# Patient Record
Sex: FEMALE | Race: WHITE
[De-identification: names, ages, dates, MRNs, and addresses within clinical notes are randomized per-mention and may not be internally consistent; named-entity substitution may affect disease eponyms.]

---

## 2017-03-04 ENCOUNTER — HOSPITAL ENCOUNTER (EMERGENCY)
Dept: HOSPITAL 80 - CED | Age: 42
Discharge: HOME | End: 2017-03-04
Payer: COMMERCIAL

## 2017-03-04 VITALS
DIASTOLIC BLOOD PRESSURE: 84 MMHG | RESPIRATION RATE: 16 BRPM | SYSTOLIC BLOOD PRESSURE: 109 MMHG | HEART RATE: 78 BPM | OXYGEN SATURATION: 98 % | TEMPERATURE: 98.4 F
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
## 2017-03-04 DIAGNOSIS — S93.411A: Primary | ICD-10-CM
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
## 2017-03-04 PROCEDURE — L4350 ANKLE CONTROL ORTHO PRE OTS: HCPCS

## 2017-03-04 NOTE — UCPHY
H & P


Patient Type: New


HPI/ROS: 





CHIEF COMPLAINT:  Right ankle pain and swelling. 





HISTORY OF PRESENT ILLNESS:  The patient is a 41-year-old female who presents 

after rolling her right ankle a few hours ago while hiking. She experienced 

immediate pain and lateral swelling. The pain is worsened with movement and she 

is having difficulty walking, Though is able to walk as she had to get out of 

woods.. She did not hear a pop or crack. She has a history of tendon injuries 

to her right ankle 5 years ago. 





REVIEW OF SYSTEMS:


Constitutional:  No fever, no chills.


Musculoskeletal:  As above. 


Skin:  No rashes.


Neurological:    No paresthesias





Past Medical/Surgical History: 





Denies. 


Social History: 





Nonsmoker, , mother, moved from [place] 1 year ago. 


Smoking Status: Never smoked


Physical Exam: 








General Appearance:  Alert, no distress.  Afebrile.


Extremities:  There is mild soft tissue swelling present over the lateral 

malleolus.  There is particular tenderness at that site as well over the 

lateral malleolus.  The drawer sign is negative.  No signs laxity.  Compression 

testing negative.


Neurological:  NV intact.


Skin:  Skin is intact.   Warm and dry, no rashes.  no lymphangitis.





.








Constitutional: 


 Initial Vital Signs











Temperature (C)  36.9 C   03/04/17 16:32


 


Heart Rate  78   03/04/17 16:32


 


Respiratory Rate  16   03/04/17 16:32


 


Blood Pressure  109/84 H  03/04/17 16:32


 


O2 Sat (%)  98   03/04/17 16:32








 











O2 Delivery Mode               Room Air














Allergies/Adverse Reactions: 


 





No Known Allergies Allergy (Verified 03/04/17 16:36)


 








Home Medications: 














 Medication  Instructions  Recorded


 


Butalbital-ASA-Caffeine Cap  03/04/17


 


Flonase Nasal Spray  03/04/17


 


Phenergan  03/04/17


 


Zofran  03/04/17














Medical Decision Making





- Diagnostics


Imaging: 





Study: Right ankle X-ray


Indication: Trauma, pain


Results: I viewed the images myself on the PACS system. My interpretation of 

the images is: no fracture.  The radiologist interpretation is pending at the 

time of this dictation. 





ED Course/Re-evaluation: 





Right ankle x-ray ordered. 





  Once x-rays were negative she was fitted for a ankle stirrup brace.


Differential Diagnosis: 





The differential diagnosis includes but is not limited to:


Fracture, Sprain, Strain, Dislocation, Nerve injury








Departure





- Departure


Disposition: Home, Routine, Self-Care


Clinical Impression: 


Right ankle sprain


Qualifiers:


 Encounter type: initial encounter Involved ligament of ankle: calcaneofibular 

ligament Qualified Code(s): S93.411A - Sprain of calcaneofibular ligament of 

right ankle, initial encounter





Condition: Good


Instructions:  Ankle Sprain (ED)


Additional Instructions: 


Take 600mg Ibuprofen every 6-8 hours as needed for pain. 





Ice affected joint for pain. 





Call Dr. Robert, orthopedics, in the next 5-7 days if symptoms are not improving. 





Return for any serious worsening of condition. 


Referrals: 


Miller Robert MD [Medical Doctor] - As per Instructions





- PQRS


PQRS Measurement: 





Not applicable. 


Report Scribed for: Steve Bolaños


Report Scribed by: Franc Albrecht


Date of Report: 03/04/17


Time of Report: 17:32


Physician Review and Approval Statement: 





03/04/17 17:33


Portions of this note were transcribed by a medical scribe.  I personally 

performed a history, physical exam, medical decision making, and confirmed 

accuracy of information the transcribed note.

## 2017-07-12 ENCOUNTER — HOSPITAL ENCOUNTER (OUTPATIENT)
Dept: HOSPITAL 80 - BMCIMAGING | Age: 42
End: 2017-07-12
Attending: PODIATRIST
Payer: COMMERCIAL

## 2017-07-12 DIAGNOSIS — M79.671: Primary | ICD-10-CM

## 2019-06-19 ENCOUNTER — HOSPITAL ENCOUNTER (OUTPATIENT)
Dept: HOSPITAL 80 - BMCIMAGING | Age: 44
End: 2019-06-19
Payer: COMMERCIAL